# Patient Record
Sex: FEMALE | Employment: FULL TIME | URBAN - METROPOLITAN AREA
[De-identification: names, ages, dates, MRNs, and addresses within clinical notes are randomized per-mention and may not be internally consistent; named-entity substitution may affect disease eponyms.]

---

## 2017-01-25 ENCOUNTER — OFFICE VISIT (OUTPATIENT)
Dept: OBGYN | Facility: CLINIC | Age: 27
End: 2017-01-25
Payer: COMMERCIAL

## 2017-01-25 VITALS
SYSTOLIC BLOOD PRESSURE: 120 MMHG | DIASTOLIC BLOOD PRESSURE: 74 MMHG | HEART RATE: 62 BPM | WEIGHT: 158 LBS | HEIGHT: 68 IN | BODY MASS INDEX: 23.95 KG/M2

## 2017-01-25 DIAGNOSIS — Z12.4 SCREENING FOR CERVICAL CANCER: ICD-10-CM

## 2017-01-25 DIAGNOSIS — Z01.419 ENCOUNTER FOR GYNECOLOGICAL EXAMINATION WITHOUT ABNORMAL FINDING: Primary | ICD-10-CM

## 2017-01-25 DIAGNOSIS — Z97.5 IUD (INTRAUTERINE DEVICE) IN PLACE: ICD-10-CM

## 2017-01-25 PROCEDURE — G0145 SCR C/V CYTO,THINLAYER,RESCR: HCPCS | Performed by: OBSTETRICS & GYNECOLOGY

## 2017-01-25 PROCEDURE — 99395 PREV VISIT EST AGE 18-39: CPT | Performed by: OBSTETRICS & GYNECOLOGY

## 2017-01-25 ASSESSMENT — ANXIETY QUESTIONNAIRES
1. FEELING NERVOUS, ANXIOUS, OR ON EDGE: NOT AT ALL
GAD7 TOTAL SCORE: 0
2. NOT BEING ABLE TO STOP OR CONTROL WORRYING: NOT AT ALL
5. BEING SO RESTLESS THAT IT IS HARD TO SIT STILL: NOT AT ALL
6. BECOMING EASILY ANNOYED OR IRRITABLE: NOT AT ALL
3. WORRYING TOO MUCH ABOUT DIFFERENT THINGS: NOT AT ALL
7. FEELING AFRAID AS IF SOMETHING AWFUL MIGHT HAPPEN: NOT AT ALL
IF YOU CHECKED OFF ANY PROBLEMS ON THIS QUESTIONNAIRE, HOW DIFFICULT HAVE THESE PROBLEMS MADE IT FOR YOU TO DO YOUR WORK, TAKE CARE OF THINGS AT HOME, OR GET ALONG WITH OTHER PEOPLE: NOT DIFFICULT AT ALL

## 2017-01-25 ASSESSMENT — PATIENT HEALTH QUESTIONNAIRE - PHQ9: 5. POOR APPETITE OR OVEREATING: NOT AT ALL

## 2017-01-25 NOTE — PROGRESS NOTES
Ana is a 26 year old No obstetric history on file. female who presents for annual exam.     Besides routine health maintenance, she would like to discuss IUD.    HPI:  No primary care provider on file..    Patient is doing great. Somehow forgot to come in last year for her exam so was sure her IUD was expiring any day now. Checked and placed  so has another 18 months or so on it.  Patient absolutely loves it. Gets very rare spotting for maybe one day and that's it. No bloating, no issues. Not pleasant to have placed so not looking forward to removal/reinsert so happy to hear she has more time on it.  Still working as an  and has her 5th test in 2 weeks. Has a great job and well paid and likely won't take the last 3 tests b/c doesn't really get her much besides more pay and responsibility and at this point she wants to just enjoy her life.  Still with her same BF for a couple of years. Did take a little break b/c he was complacent and felt like they were roommates more than BF/GF. He has done a lot better and they are now back together and living together.  No other health concerns. Due for a pap. [place] on radio today about someone having cervical cancer and it made her wonder what screening we do for ovarian. No fhx of breast or ovarian cancer that she knows of. Her mom's aunt had what was thought to be uterine but had cancer in one ovary as well. Not sure if it was primary or a met. Great aunt  of it and no one really knows now      GYNECOLOGIC HISTORY:    No LMP recorded. Patient is not currently having periods (Reason: IUD).  Her current contraception method is: IUD.  She  reports that she has never smoked. She does not have any smokeless tobacco history on file.    Patient is sexually active.  STD testing offered?  Declined  Last PHQ-9 score on record =   PHQ-9 SCORE 2017   Total Score 1     Last GAD7 score on record =   TERESA-7 SCORE 2017   Total Score 0     Alcohol Score =  "4    HEALTH MAINTENANCE:  Cholesterol: (No results found for: CHOL   Last Mammo: NA  Pap: 5/3/13 neg  Colonoscopy: NA  Dexa:  Never    Health maintenance updated:  yes    HISTORY:  Obstetric History     No data available          Patient Active Problem List   Diagnosis     IUD (intrauterine device) in place     No past surgical history on file.   Social History   Substance Use Topics     Smoking status: Never Smoker      Smokeless tobacco: Not on file     Alcohol Use: Yes           Current Outpatient Prescriptions   Medication Sig     levonorgestrel (MIRENA) 20 MCG/24HR IUD 1 each by Intrauterine route once     No current facility-administered medications for this visit.     No Known Allergies    Past medical, surgical, social and family histories were reviewed and updated in EPIC.    ROS:   12 point review of systems negative other than symptoms noted below.    EXAM:  /74 mmHg  Pulse 62  Ht 5' 7.5\" (1.715 m)  Wt 158 lb (71.668 kg)  BMI 24.37 kg/m2   BMI: Body mass index is 24.37 kg/(m^2).    PHYSICAL EXAM:  Constitutional:  Appearance: Well nourished, well developed, alert, in no acute distress  Neck:  Lymph Nodes:  No lymphadenopathy present    Thyroid:  Gland size normal, nontender, no nodules or masses present  on palpation  Chest:  Respiratory Effort:  Breathing unlabored  Cardiovascular:    Heart: Auscultation:  Regular rate, normal rhythm, no murmurs present  Breasts: Inspection of Breasts:  No lymphadenopathy present    Palpation of Breasts and Axillae:  No masses present on palpation, no  breast tenderness    Axillary Lymph Nodes:  No lymphadenopathy present  Gastrointestinal:   Abdominal Examination:  Abdomen nontender to palpation, tone normal without rigidity or guarding, no masses present, umbilicus without lesions   Liver and Spleen:  No hepatomegaly present, liver nontender to palpation    Hernias:  No hernias present  Lymphatic: Lymph Nodes:  No other lymphadenopathy present  Skin:  General " Inspection:  No rashes present, no lesions present, no areas of  discoloration    Genitalia and Groin:  No rashes present, no lesions present, no areas of  discoloration, no masses present  Neurologic/Psychiatric:    Mental Status:  Oriented X3     Pelvic Exam:  External Genitalia:     Normal appearance for age, no discharge present, no tenderness present, no inflammatory lesions present, color normal  Vagina:    Normal vaginal vault without central or paravaginal defects, no discharge present, no inflammatory lesions present, no masses present  Bladder:     Nontender to palpation  Urethra:   Urethral Body:  Urethra palpation normal, urethra structural support normal   Urethral Meatus:  No erythema or lesions present  Cervix:     Appearance healthy, no lesions present, nontender to palpation, no bleeding present, string present  Uterus:     Nontender to palpation, no masses present, position anteflexed, mobility: normal  Adnexa:     No adnexal tenderness present, no adnexal masses present  Perineum:     Perineum within normal limits, no evidence of trauma, no rashes or skin lesions present  Anus:     Anus within normal limits, no hemorrhoids present  Inguinal Lymph Nodes:     No lymphadenopathy present  Pubic Hair:     Normal pubic hair distribution for age  Genitalia and Groin:     No rashes present, no lesions present, no areas of discoloration, no masses present    COUNSELING:   Reviewed preventive health counseling, as reflected in patient instructions    BMI: Body mass index is 24.37 kg/(m^2).      ASSESSMENT:  26 year old female with satisfactory annual exam.    ICD-10-CM    1. Encounter for gynecological examination without abnormal finding [Z01.419] Z01.419    2. Screening for cervical cancer Z12.4 Pap imaged thin layer screen reflex to HPV if ASCUS - recommended age 25 - 29 years   3. IUD (intrauterine device) in place Z97.5        PLAN:  Pap with reflex for HPV done today  IUD in place and patient loving  it. Will plan to do her annual closer to summer next year and then plan to swap out the IUD at that time.  Consider fasting labs at some point in the near future.    Evelyne Cuevas MD

## 2017-01-26 ASSESSMENT — PATIENT HEALTH QUESTIONNAIRE - PHQ9: SUM OF ALL RESPONSES TO PHQ QUESTIONS 1-9: 1

## 2017-01-26 ASSESSMENT — ANXIETY QUESTIONNAIRES: GAD7 TOTAL SCORE: 0

## 2017-01-27 LAB
COPATH REPORT: NORMAL
PAP: NORMAL

## 2017-01-31 NOTE — PROGRESS NOTES
Quick Note:    Pap is negative. Per current guidelines, she is due next for cervical cancer screening in 3 years. We still recommend, however, an annual women's health exam.   Please call pt to notify.     Steph Gar, DO    ______

## 2018-08-08 ENCOUNTER — OFFICE VISIT (OUTPATIENT)
Dept: OBGYN | Facility: CLINIC | Age: 28
End: 2018-08-08
Payer: COMMERCIAL

## 2018-08-08 VITALS
DIASTOLIC BLOOD PRESSURE: 70 MMHG | WEIGHT: 167.4 LBS | HEIGHT: 68 IN | BODY MASS INDEX: 25.37 KG/M2 | HEART RATE: 48 BPM | SYSTOLIC BLOOD PRESSURE: 110 MMHG

## 2018-08-08 DIAGNOSIS — Z01.419 ENCOUNTER FOR GYNECOLOGICAL EXAMINATION WITHOUT ABNORMAL FINDING: Primary | ICD-10-CM

## 2018-08-08 DIAGNOSIS — Z97.5 IUD (INTRAUTERINE DEVICE) IN PLACE: ICD-10-CM

## 2018-08-08 PROCEDURE — 99395 PREV VISIT EST AGE 18-39: CPT | Performed by: OBSTETRICS & GYNECOLOGY

## 2018-08-08 ASSESSMENT — ANXIETY QUESTIONNAIRES
2. NOT BEING ABLE TO STOP OR CONTROL WORRYING: NOT AT ALL
7. FEELING AFRAID AS IF SOMETHING AWFUL MIGHT HAPPEN: NOT AT ALL
3. WORRYING TOO MUCH ABOUT DIFFERENT THINGS: SEVERAL DAYS
1. FEELING NERVOUS, ANXIOUS, OR ON EDGE: SEVERAL DAYS
GAD7 TOTAL SCORE: 2
5. BEING SO RESTLESS THAT IT IS HARD TO SIT STILL: NOT AT ALL
6. BECOMING EASILY ANNOYED OR IRRITABLE: NOT AT ALL
IF YOU CHECKED OFF ANY PROBLEMS ON THIS QUESTIONNAIRE, HOW DIFFICULT HAVE THESE PROBLEMS MADE IT FOR YOU TO DO YOUR WORK, TAKE CARE OF THINGS AT HOME, OR GET ALONG WITH OTHER PEOPLE: NOT DIFFICULT AT ALL

## 2018-08-08 ASSESSMENT — PATIENT HEALTH QUESTIONNAIRE - PHQ9: 5. POOR APPETITE OR OVEREATING: NOT AT ALL

## 2018-08-08 NOTE — PROGRESS NOTES
Ana is a 27 year old  female who presents for annual exam.     Besides routine health maintenance, she has no other health concerns today .    HPI:  The patient's PCP is [facility].      Patient is doing great. Her BF just got a 2 yr contract with [employer] to go to [place] for his job so she is moving and really excited. Can keep her job and work remotely and so is going to and excited for a new adventure. Her mom is really struggling so having some fighting with her b/c she's upset to lose her but hoping she'll come around    Her IUD has been in for just over 5 yrs. Getting one day of spotting a month at most and nothing else which has been great. Has an appointment in one week to swap it out to a new one and then moving  but will keep coming here for visits at least for now.    Dad just dx'd with HTn in his late 40's. She is doing tons of cardio and playing regular volleyball and eats clean. Weight gain is very slow but steadily a few pounds a year. Doesn't watch the scale and feels toned and clothes still fit so not worrying about it      GYNECOLOGIC HISTORY:    Patient's last menstrual period was 2018.  Her current contraception method is: IUD.  She  reports that she has never smoked. She has never used smokeless tobacco.    Patient is sexually active.  STD testing offered?  Declined  Last PHQ-9 score on record =   PHQ-9 SCORE 2018   Total Score 2     Last GAD7 score on record =   TERESA-7 SCORE 2018   Total Score 2     Alcohol Score = 3    HEALTH MAINTENANCE:  Cholesterol: (No results found for: CHOL   Last Mammo: never, Result: not applicable, Next Mammo: age  41  Pap:   Lab Results   Component Value Date    PAP NIL 2017      Colonoscopy:  never, Result: not applicable, Next Colonoscopy: age 51 years.  Dexa:  never    Health maintenance updated:  yes    HISTORY:  Obstetric History       T0      L0     SAB0   TAB0   Ectopic0   Multiple0   Live  "Births0           Patient Active Problem List   Diagnosis     IUD (intrauterine device) in place     Past Surgical History:   Procedure Laterality Date     NO HISTORY OF SURGERY        Social History   Substance Use Topics     Smoking status: Never Smoker     Smokeless tobacco: Never Used     Alcohol use Yes      Problem (# of Occurrences) Relation (Name,Age of Onset)    Behavior Disorder (1) Maternal Grandmother: narcotic pill addiction    Colon Cancer (1) Maternal Grandfather (52): recurred at 54 but then lived 20+ yrs.  2018 of kidney failure    Hypertension (1) Father    Kidney Cancer (1) Maternal Grandfather    Type 2 Diabetes (1) Maternal Grandmother    Uterine Cancer (1) Other: maternal great aunt. had cancer in one ovary as well so not clear if ovarian or uterine primary            Current Outpatient Prescriptions   Medication Sig     levonorgestrel (MIRENA) 20 MCG/24HR IUD 1 each by Intrauterine route once     No current facility-administered medications for this visit.      No Known Allergies    Past medical, surgical, social and family histories were reviewed and updated in EPIC.    ROS:   12 point review of systems negative other than symptoms noted below.  Constitutional: Fatigue    EXAM:  /70  Pulse (!) 48  Ht 5' 7.5\" (1.715 m)  Wt 167 lb 6.4 oz (75.9 kg)  LMP 2018  Breastfeeding? No  BMI 25.83 kg/m2   BMI: Body mass index is 25.83 kg/(m^2).    PHYSICAL EXAM:  Constitutional:  Appearance: Well nourished, well developed, alert, in no acute distress  Neck:  Lymph Nodes:  No lymphadenopathy present    Thyroid:  Gland size normal, nontender, no nodules or masses present  on palpation  Chest:  Respiratory Effort:  Breathing unlabored  Cardiovascular:    Heart: Auscultation:  Regular rate, normal rhythm, no murmurs present  Breasts: Palpation of Breasts and Axillae:  No masses present on palpation, no breast tenderness. and No nodularity, asymmetry or nipple discharge " bilaterally.  Gastrointestinal:   Abdominal Examination:  Abdomen nontender to palpation, tone normal without rigidity or guarding, no masses present, umbilicus without lesions   Liver and Spleen:  No hepatomegaly present, liver nontender to palpation    Hernias:  No hernias present  Lymphatic: Lymph Nodes:  No other lymphadenopathy present  Skin:  General Inspection:  No rashes present, no lesions present, no areas of  discoloration    Genitalia and Groin:  No rashes present, no lesions present, no areas of  discoloration, no masses present  Neurologic/Psychiatric:    Mental Status:  Oriented X3     Pelvic Exam:  External Genitalia:     Normal appearance for age, no discharge present, no tenderness present, no inflammatory lesions present, color normal  Vagina:    Normal vaginal vault without central or paravaginal defects, no discharge present, no inflammatory lesions present, no masses present  Bladder:     Nontender to palpation  Urethra:   Urethral Body:  Urethra palpation normal, urethra structural support normal   Urethral Meatus:  No erythema or lesions present  Cervix:     Appearance healthy, no lesions present, nontender to palpation, no bleeding present, string present  Uterus:     Nontender to palpation, no masses present, position anteflexed, mobility: normal  Adnexa:     No adnexal tenderness present, no adnexal masses present  Perineum:     Perineum within normal limits, no evidence of trauma, no rashes or skin lesions present  Anus:     Anus within normal limits, no hemorrhoids present  Inguinal Lymph Nodes:     No lymphadenopathy present  Pubic Hair:     Normal pubic hair distribution for age  Genitalia and Groin:     No rashes present, no lesions present, no areas of discoloration, no masses present      COUNSELING:   Reviewed preventive health counseling, as reflected in patient instructions  Special attention given to:        Contraception    BMI: Body mass index is 25.83  kg/(m^2).      ASSESSMENT:  27 year old female with satisfactory annual exam.    ICD-10-CM    1. Encounter for gynecological examination without abnormal finding Z01.419    2. IUD (intrauterine device) in place Z97.5        PLAN:  Pap is UTD for 2 more years  Return next week for IUD removal and then will replace with kyleena just for comfort and smaller size. Will take 800mg advil one hour in advance to help with cramping. Discussed pros/cons of mirena and kyleena and patient is comfortable trying the smaller one    Evelyne Cuevas MD

## 2018-08-08 NOTE — MR AVS SNAPSHOT
"              After Visit Summary   8/8/2018    Ana Mcdonnell    MRN: [id number]           Patient Information     Date Of Birth          1990        Visit Information        Provider Department      8/8/2018 1:40 PM Evelyne Cuevas MD [facility]        Today's Diagnoses     Encounter for gynecological examination without abnormal finding    -  1    IUD (intrauterine device) in place           Follow-ups after your visit        Your next 10 appointments already scheduled     Aug 15, 2018  1:00 PM CDT   Office Visit with Evelyne Cuevas MD   [facility] ([facility]    [address] [place] [id number] [phone]           Bring a current list of meds and any records pertaining to this visit. For Physicals, please bring immunization records and any forms needing to be filled out. Please arrive 10 minutes early to complete paperwork.              Who to contact     If you have questions or need follow up information about today's clinic visit or your schedule please contact [facility] directly at [phone].  Normal or non-critical lab and imaging results will be communicated to you by MUV Interactivehart, letter or phone within 4 business days after the clinic has received the results. If you do not hear from us within 7 days, please contact the clinic through SelSaharat or phone. If you have a critical or abnormal lab result, we will notify you by phone as soon as possible.  Submit refill requests through PaxVax or call your pharmacy and they will forward the refill request to us. Please allow 3 business days for your refill to be completed.          Additional Information About Your Visit        MUV Interactivehart Information     PaxVax lets you send messages to your doctor, view your test results, renew your prescriptions, schedule appointments and more. To sign up, go to [email or web address] . Click on \"Log in\" on the " "left side of the screen, which will take you to the Welcome page. Then click on \"Sign up Now\" on the right side of the page.     You will be asked to enter the access code listed below, as well as some personal information. Please follow the directions to create your username and password.     Your access code is: [email or web address]  Expires: 2018  1:30 PM     Your access code will  in 90 days. If you need help or a new code, please call your [facility] clinic or [phone].        Care EveryWhere ID     This is your Care EveryWhere ID. This could be used by other organizations to access your [facility] medical records  [id number]        Your Vitals Were     Pulse Height Last Period Breastfeeding? BMI (Body Mass Index)       48 5' 7.5\" (1.715 m) 2018 No 25.83 kg/m2        Blood Pressure from Last 3 Encounters:   18 110/70   17 120/74    Weight from Last 3 Encounters:   18 167 lb 6.4 oz (75.9 kg)   17 158 lb (71.7 kg)              Today, you had the following     No orders found for display       Primary Care Provider Office Phone # Fax #    [facility] [phone]       [place] [address]  [address] [address]        Equal Access to Services     RANJANA CHAMBERS : Hadii aad ku hadasho Soomaali, waaxda luqadaha, qaybta kaalmada adeegyada, sylvia rivera . So [facility] [phone].    ATENCIÓN: Si habla español, tiene a messer disposición servicios gratuitos de asistencia lingüística. Llame al [phone].    We comply with applicable federal civil rights laws and [place] laws. We do not discriminate on the basis of race, color, national origin, age, disability, sex, sexual orientation, or gender identity.            Thank you!     Thank you for choosing [facility]  for your care. Our goal is always to provide you with excellent care. Hearing back from our patients " is one way we can continue to improve our services. Please take a few minutes to complete the written survey that you may receive in the mail after your visit with us. Thank you!             Your Updated Medication List - Protect others around you: Learn how to safely use, store and throw away your medicines at www.disposemymeds.org.          This list is accurate as of 8/8/18  5:30 PM.  Always use your most recent med list.                   Brand Name Dispense Instructions for use Diagnosis    levonorgestrel 20 MCG/24HR IUD    MIRENA     1 each by Intrauterine route once

## 2018-08-10 ASSESSMENT — PATIENT HEALTH QUESTIONNAIRE - PHQ9: SUM OF ALL RESPONSES TO PHQ QUESTIONS 1-9: 2

## 2018-08-10 ASSESSMENT — ANXIETY QUESTIONNAIRES: GAD7 TOTAL SCORE: 2

## 2018-08-15 ENCOUNTER — OFFICE VISIT (OUTPATIENT)
Dept: OBGYN | Facility: CLINIC | Age: 28
End: 2018-08-15
Payer: COMMERCIAL

## 2018-08-15 VITALS
BODY MASS INDEX: 25.31 KG/M2 | SYSTOLIC BLOOD PRESSURE: 120 MMHG | DIASTOLIC BLOOD PRESSURE: 76 MMHG | HEIGHT: 68 IN | WEIGHT: 167 LBS

## 2018-08-15 DIAGNOSIS — Z01.812 PRE-PROCEDURE LAB EXAM: ICD-10-CM

## 2018-08-15 DIAGNOSIS — Z30.433 ENCOUNTER FOR REMOVAL AND REINSERTION OF INTRAUTERINE CONTRACEPTIVE DEVICE: Primary | ICD-10-CM

## 2018-08-15 LAB — BETA HCG QUAL IFA URINE: NEGATIVE

## 2018-08-15 PROCEDURE — 58301 REMOVE INTRAUTERINE DEVICE: CPT | Performed by: OBSTETRICS & GYNECOLOGY

## 2018-08-15 PROCEDURE — 58300 INSERT INTRAUTERINE DEVICE: CPT | Performed by: OBSTETRICS & GYNECOLOGY

## 2018-08-15 PROCEDURE — 84703 CHORIONIC GONADOTROPIN ASSAY: CPT | Performed by: OBSTETRICS & GYNECOLOGY

## 2018-08-15 NOTE — MR AVS SNAPSHOT
"              After Visit Summary   8/15/2018    Ana Mcdonnell    MRN: [id number]           Patient Information     Date Of Birth          1990        Visit Information        Provider Department      8/15/2018 1:00 PM Evelyne Cuevas MD [facility]        Today's Diagnoses     Encounter for removal and reinsertion of intrauterine contraceptive device    -  1    Pre-procedure lab exam           Follow-ups after your visit        Who to contact     If you have questions or need follow up information about today's clinic visit or your schedule please contact [facility] directly at [phone].  Normal or non-critical lab and imaging results will be communicated to you by MyChart, letter or phone within 4 business days after the clinic has received the results. If you do not hear from us within 7 days, please contact the clinic through Gear Energyhart or phone. If you have a critical or abnormal lab result, we will notify you by phone as soon as possible.  Submit refill requests through CorNova or call your pharmacy and they will forward the refill request to us. Please allow 3 business days for your refill to be completed.          Additional Information About Your Visit        MyChart Information     CorNova lets you send messages to your doctor, view your test results, renew your prescriptions, schedule appointments and more. To sign up, go to [email or web address] . Click on \"Log in\" on the left side of the screen, which will take you to the Welcome page. Then click on \"Sign up Now\" on the right side of the page.     You will be asked to enter the access code listed below, as well as some personal information. Please follow the directions to create your username and password.     Your access code is: [email or web address]  Expires: 2018  1:30 PM     Your access code will  in 90 days. If you need help or a new code, please call your [facility] or [phone].      " "  Care EveryWhere ID     This is your Care EveryWhere ID. This could be used by other organizations to access your [facility] medical records  [id number]        Your Vitals Were     Height Last Period BMI (Body Mass Index)             5' 7.5\" (1.715 m) 07/24/2018 25.77 kg/m2          Blood Pressure from Last 3 Encounters:   08/15/18 120/76   08/08/18 110/70   01/25/17 120/74    Weight from Last 3 Encounters:   08/15/18 167 lb (75.8 kg)   08/08/18 167 lb 6.4 oz (75.9 kg)   01/25/17 158 lb (71.7 kg)              We Performed the Following     Beta HCG Qual, Urine - [facility] and [place] ([id number])     C KYLEENA IUD 19.5 MG     INSERTION INTRAUTERINE DEVICE     REMOVE INTRAUTERINE DEVICE          Today's Medication Changes          These changes are accurate as of 8/15/18  1:32 PM.  If you have any questions, ask your nurse or doctor.               Start taking these medicines.        Dose/Directions    levonorgestrel 19.5 MG IUD   Commonly known as:  KYLEENA   Used for:  Encounter for removal and reinsertion of intrauterine contraceptive device   Started by:  Evelyne Cuevas MD        Dose:  1 each   1 each (19.5 mg) by Intrauterine route once for 1 dose   Quantity:  1 each   Refills:  0            Where to get your medicines      Some of these will need a paper prescription and others can be bought over the counter.  Ask your nurse if you have questions.     You don't need a prescription for these medications     levonorgestrel 19.5 MG IUD                Primary Care Provider Office Phone # Fax #    [facility] [phone]       [facility] [address]  [facility] [address]  [facility] [address]        Equal Access to Services     RANJANA CHAMBERS AH: Devorah Jefferson, wakate loza, qafernanda kaalmada mata, sylvia benitez. So [facility] [phone].    ATENCIÓN: Si habla español, tiene a messer disposición servicios gratuitos de " asistencia lingüística. Lori al [phone].    We comply with applicable federal civil rights laws and [place] laws. We do not discriminate on the basis of race, color, national origin, age, disability, sex, sexual orientation, or gender identity.            Thank you!     Thank you for choosing [facility]  for your care. Our goal is always to provide you with excellent care. Hearing back from our patients is one way we can continue to improve our services. Please take a few minutes to complete the written survey that you may receive in the mail after your visit with us. Thank you!             Your Updated Medication List - Protect others around you: Learn how to safely use, store and throw away your medicines at www.disposemymeds.org.          This list is accurate as of 8/15/18  1:32 PM.  Always use your most recent med list.                   Brand Name Dispense Instructions for use Diagnosis    levonorgestrel 19.5 MG IUD    KYLEENA    1 each    1 each (19.5 mg) by Intrauterine route once for 1 dose    Encounter for removal and reinsertion of intrauterine contraceptive device

## 2018-08-15 NOTE — PROGRESS NOTES
SUBJECTIVE:    Is a pregnancy test required: Yes.  Was it positive or negative?  Negative  Was a consent obtained?  Yes    Subjective: Ana Mcdonnell is a 27 year old  presents for IUD and desires Kyleena type IUD.  She requests removal of the IUD because the IUD effectiveness has     Patient has been given the opportunity to ask questions about all forms of birth control, including all options appropriate for Ana Mcdonnell. Discussed that no method of birth control, except abstinence is 100% effective against pregnancy or sexually transmitted infection.     Ana Mcdonnell understands she may have the IUD removed at any time. IUD should be removed by a health care provider and the current IUD will be removed today.    The entire removal and insertion procedure was reviewed with the patient, including care after placement.    PROCEDURE:    Premedicated with ibuprofen.  A speculum exam was performed and the cervix was visualized. The IUD string was visualized. Using ring forceps, the string  was grasped and the IUD removed intact.    Under sterile technique, cervix was visualized with speculum and prepped with Betadine solution swab x 3. Tenaculum was placed for stability. The uterus was gently straightened and sounded to 7.0 cm. IUD prepared for placement, and IUD inserted according to 's instructions without difficulty or significant ressitance, and deployed at the fundus. The strings were visualized and trimmed to 3.0 cm from the external os. Tenaculum was removed and hemostasis noted. Speculum removed.  Patient tolerated procedure well.    EBL: minimal    Complications: none      POST PROCEDURE:    Given 's handouts, including when to have IUD removed, list of danger s/sx, side effects and follow up recommended. Encouraged condom use for prevention of STD. Advised to call for any fever, for prolonged or severe pain or bleeding, abnormal vaginal dischage, or unable to palpate  strings. She was advised to use pain medications (ibuprofen) as needed for mild to moderate pain.   Evelyne Cuevas MD

## 2019-11-07 ENCOUNTER — TELEPHONE (OUTPATIENT)
Dept: OBGYN | Facility: CLINIC | Age: 29
End: 2019-11-07

## 2019-11-07 NOTE — TELEPHONE ENCOUNTER
Type of Paperwork received:  Emotional support animal     Date Rcvd:  11/7/19    Rcvd From (Company name): [email or web address]    Provider:  Mason Keller on Provider Cart Date:  11/7/19

## 2019-11-09 ENCOUNTER — TELEPHONE (OUTPATIENT)
Dept: OBGYN | Facility: CLINIC | Age: 29
End: 2019-11-09

## 2019-11-09 NOTE — TELEPHONE ENCOUNTER
I received some paperwork from this patient to fill out that allows her dog to be considered an emotional support animal to allow her to fly with him.  A few years back I filled out paperwork similar for a dog but it was to allow her to have the dog in her apt buidling that didn't technically allow pets and I filled it out.  Since then criteria are much different and especially for flying with a dog, of any kind, much less a half pitbull and half bulldog, I can't fill out the papers she's asking for.  I feel bad not to be able to help her and please let her know that. The problem is that I am certifying that the dog is safe based on her word. If the dog then bit someone or something happened I am on the line.  I don't certify animals personally and have no qualifications to do so and having never even laid eyes on this particular dog I just can't do that.    Again, tell her I'm really sorry. I really do want to be helpful. But this is just not within my scope of practice to be able to do.

## 2019-11-11 NOTE — TELEPHONE ENCOUNTER
Called and left a detailed vm with below message from Dr Cuevas.  Encouraged to call and speak with nurse with any questions    Marie Rasmussen RN on 11/11/2019 at 11:16 AM

## 2020-12-22 NOTE — PROGRESS NOTES
"Ana is a 30 year old  female who presents for annual exam.     Besides routine health maintenance, she has no other health concerns today .    HPI:  The patient's PCP is none.      Patient hasn't been seen by me in about 2 yrs or so after moving to [place] with her BF at the time.  Since that time, as reported to me previously by her mom who is my patient, and today by herself, she began to abuse alcohol. Had a couple of tickets/accidents due to it and was making very unsafe choices  Parents tried to stage interventions and she didn't accept. Broke up with her BF that she moved there with. He was also drinking quite a bit though not to her level and yet never said anything to her about it being a problem or offering to help/intervene    Finally had an accident that was within the probation period of her DUI and finally realized b/c of legal ramifications that it was an issues. Went to rehab for 30 days right as everything locked down for pandemic.   Now sober 10 months and feeling really strong and great and in a good head space. Has a therapist/psych provider in [place] that she is in regular contact with. Now on fluoxetine 40mg for anxiety, ruminating, over analyzing, second guessing/self doubt. Felt good on 20mg but not quite enough and now on 40mg is feeling good. Definitely feels like her memory isn't as great and maybe just not as clear headed/focused but will take that considering her anxiety is completely under control. Has 2 days every month that she now realizes is cyclic and PMS, where just feels completely unmotivated, tired, \"blah\", wants to be left alone, etc. Then completely resolves. Otherwise has absolutely no period with her kyleena that has been in since  and loves it. Now that knows that part is hormonal and cyclic it's much easier to manage b/c can prepare and is aware.    Has a new BF since this summer. He has a 8 yo with autism that he coparents. Hasn't met the son yet and no " urgency to do so. BF doesn't want more kids and she's never wanted kids so really happy with that. Relationship is great. He's a social drinker but actually quite minimal so a good support. Has a good support system of friends who are all social drinkers but not excessive so feels like didn't have to give up her support and friend group. Loves [place] and has no desire to move back here. Has her  job and has been doing it remotely since her move and loves it. BF never finished HS. Established and has a good job but her parents/mom really don't like that for her which bothers her. Not rushing them meeting him and just going to enjoy dating him for now.  Has had STD testing since starting to be s.a with him so denies need for it today    Patient has not had flu/gardasil/tdap but declines them all  No fasting labs recently but not fasting this afternoon and would prefer to hold off at this time      GYNECOLOGIC HISTORY:    No LMP recorded (lmp unknown). (Menstrual status: IUD).    Regular menses? no  Menses every irregular days due to Kyleena.  Length of menses: NA days    Her current contraception method is: IUD.  She  reports that she has never smoked. She has never used smokeless tobacco.    Patient is sexually active.  STD testing offered?  Declined  Last PHQ-9 score on record =   PHQ-9 SCORE 2020   PHQ-9 Total Score 0     Last GAD7 score on record =   TERESA-7 SCORE 2020   Total Score 0     Alcohol Score = 0    HEALTH MAINTENANCE:  Cholesterol: (No results found for: CHOL)  Last Mammo: Not applicable, Next Mammo: Due at age 41   Pap:   Lab Results   Component Value Date    PAP NIL 2017      Colonoscopy:  NA, Next Colonoscopy: age 46.  Dexa:  NA    Health maintenance updated:  no, due for pap smear    HISTORY:  OB History    Para Term  AB Living   0 0 0 0 0 0   SAB TAB Ectopic Multiple Live Births   0 0 0 0 0       Patient Active Problem List   Diagnosis     Presence of IUD      "Alcohol dependence in remission (H)     Anxiety     Mild episode of depression (H)     Past Surgical History:   Procedure Laterality Date     NO HISTORY OF SURGERY        Social History     Tobacco Use     Smoking status: Never Smoker     Smokeless tobacco: Never Used   Substance Use Topics     Alcohol use: Not Currently      Problem (# of Occurrences) Relation (Name,Age of Onset)    Behavior Disorder (1) Maternal Grandmother: narcotic pill addiction    Colon Cancer (1) Maternal Grandfather (52): recurred at 54 but then lived 20+ yrs.  2018 of kidney failure    Diabetes Type 2  (1) Maternal Grandmother    Hypertension (1) Father    Kidney Cancer (1) Maternal Grandfather    Uterine Cancer (1) Other: maternal great aunt. had cancer in one ovary as well so not clear if ovarian or uterine primary            Current Outpatient Medications   Medication Sig     buPROPion (WELLBUTRIN XL) 150 MG 24 hr tablet Take 1 tablet (150 mg) by mouth every morning     FLUoxetine (PROZAC) 40 MG capsule Take 40 mg by mouth daily     levonorgestrel (KYLEENA) 19.5 MG IUD 1 each (19.5 mg) by Intrauterine route once for 1 dose     No current facility-administered medications for this visit.      No Known Allergies    Past medical, surgical, social and family histories were reviewed and updated in EPIC.    ROS:   12 point review of systems negative other than symptoms noted below or in the HPI.  No urinary frequency or dysuria, bladder or kidney problems    EXAM:  /70 (BP Location: Right arm, Patient Position: Sitting, Cuff Size: Adult Regular)   Pulse (!) 42   Ht 1.715 m (5' 7.5\")   Wt 78.5 kg (173 lb)   LMP  (LMP Unknown)   BMI 26.70 kg/m     BMI: Body mass index is 26.7 kg/m .    PHYSICAL EXAM:  Constitutional:   Appearance: Well nourished, well developed, alert, in no acute distress  Neck:  Lymph Nodes:  No lymphadenopathy present    Thyroid:  Gland size normal, nontender, no nodules or masses present  on " palpation  Chest:  Respiratory Effort:  Breathing unlabored  Cardiovascular:    Heart: Auscultation:  Regular rate, normal rhythm, no murmurs present  Breasts: Palpation of Breasts and Axillae:  No masses present on palpation, no breast tenderness., Axillary Lymph Nodes:  No lymphadenopathy present. and No nodularity, asymmetry or nipple discharge bilaterally.  Gastrointestinal:   Abdominal Examination:  Abdomen nontender to palpation, tone normal without rigidity or guarding, no masses present, umbilicus without lesions   Liver and Spleen:  No hepatomegaly present, liver nontender to palpation    Hernias:  No hernias present  Lymphatic: Lymph Nodes:  No other lymphadenopathy present  Skin:  General Inspection:  No rashes present, no lesions present, no areas of  discoloration  Neurologic:    Mental Status:  Oriented X3.  Normal strength and tone, sensory exam                grossly normal, mentation intact and speech normal.    Psychiatric:   Mentation appears normal and affect normal/bright.         Pelvic Exam:  External Genitalia:     Normal appearance for age, no discharge present, no tenderness present, no inflammatory lesions present, color normal  Vagina:    Normal vaginal vault without central or paravaginal defects, no discharge present, no inflammatory lesions present, no masses present  Bladder:     Nontender to palpation  Urethra:   Urethral Body:  Urethra palpation normal, urethra structural support normal   Urethral Meatus:  No erythema or lesions present  Cervix:     Appearance healthy, no lesions present, nontender to palpation, no bleeding present, string present  Uterus:     Nontender to palpation, no masses present, position anteflexed, mobility: normal  Adnexa:     No adnexal tenderness present, no adnexal masses present  Perineum:     Perineum within normal limits, no evidence of trauma, no rashes or skin lesions present  Anus:     Anus within normal limits, no hemorrhoids present  Inguinal  Lymph Nodes:     No lymphadenopathy present  Pubic Hair:     Normal pubic hair distribution for age  Genitalia and Groin:     No rashes present, no lesions present, no areas of discoloration, no masses present      COUNSELING:   Reviewed preventive health counseling, as reflected in patient instructions  Special attention given to:        Depression         Immunizations    Declined: Human Papillomavirus, Influenza and TDAP due to Conscientious objector               Contraception       Safe sex practices/STD prevention    BMI: Body mass index is 26.7 kg/m .      ASSESSMENT:  30 year old female with satisfactory annual exam.    ICD-10-CM    1. Encounter for gynecological examination without abnormal finding  Z01.419 Pap imaged thin layer screen reflex to HPV if ASCUS - recommend age 25 - 29   2. Mild episode of depression (H)  F32.0 buPROPion (WELLBUTRIN XL) 150 MG 24 hr tablet   3. Alcohol dependence in remission (H)  F10.21    4. Anxiety  F41.9    5. Presence of IUD  Z97.5        PLAN:  Pap with reflex to HPV testing today  Reviewed her IUD status and PMS and patient is very happy with her amenorrhea, etc. Has about 3 more years on it and then can swap it out for another or consider mirena, as tolerated kyleena well, and then could get 6 yrs out of it rather than 5    Reviewed all of what she's gone through in the last year since she was here last  Commended for her amazing efforts in recovery in the midst of a pandemic and breakup and being out of state from her parents  Has great support in her friends and new BF and has a regular contact with therapist/prescriber for anxiety etc    In further discussions of her moods in terms of anxiety, fluoxetine and some PMS component it became obvious to both of us that she does have some generally blunted affect and some anhedonia with the fluox that she is not as aware of b/c so relieved to have the anxiety under control  The lack of focus and poor memory are likely from  some of this blunting  Since I am not the prescriber of the fluox encouraged her to discuss that with her provider in [place]  A chance in selective serotonin reuptake inhibitor could work better for her in terms of anxiety and depression/flattening both  Discussed selective serotonin reuptake inhibitor vs SNRI in this capacity  However also discussed addition of wellbutrin and staying on the fluox since such good control of her anxiety  Discussed the expected effects and side effects of insomnia and vivid dreams  Patient is very interested in adding wellbutrin to see If that works for her  Can do a telehealth f/u if that is an option with her residing in [place] or could do an e-visit at least in f/u in 6 weeks and then could see if her provider in [place] can take over the Rx if she is doing well on it    Spent an additional 15  min with the patient 100% of which was in face to face counseling time          Evelyne Cuevas MD

## 2020-12-23 ENCOUNTER — OFFICE VISIT (OUTPATIENT)
Dept: OBGYN | Facility: CLINIC | Age: 30
End: 2020-12-23
Payer: COMMERCIAL

## 2020-12-23 VITALS
SYSTOLIC BLOOD PRESSURE: 110 MMHG | WEIGHT: 173 LBS | BODY MASS INDEX: 26.22 KG/M2 | HEIGHT: 68 IN | DIASTOLIC BLOOD PRESSURE: 70 MMHG | HEART RATE: 42 BPM

## 2020-12-23 DIAGNOSIS — Z97.5 PRESENCE OF IUD: ICD-10-CM

## 2020-12-23 DIAGNOSIS — F32.A MILD EPISODE OF DEPRESSION: ICD-10-CM

## 2020-12-23 DIAGNOSIS — Z01.419 ENCOUNTER FOR GYNECOLOGICAL EXAMINATION WITHOUT ABNORMAL FINDING: Primary | ICD-10-CM

## 2020-12-23 DIAGNOSIS — F41.9 ANXIETY: ICD-10-CM

## 2020-12-23 DIAGNOSIS — F10.21 ALCOHOL DEPENDENCE IN REMISSION (H): ICD-10-CM

## 2020-12-23 PROCEDURE — 99395 PREV VISIT EST AGE 18-39: CPT | Performed by: OBSTETRICS & GYNECOLOGY

## 2020-12-23 PROCEDURE — G0145 SCR C/V CYTO,THINLAYER,RESCR: HCPCS | Performed by: OBSTETRICS & GYNECOLOGY

## 2020-12-23 PROCEDURE — 99213 OFFICE O/P EST LOW 20 MIN: CPT | Mod: 25 | Performed by: OBSTETRICS & GYNECOLOGY

## 2020-12-23 RX ORDER — BUPROPION HYDROCHLORIDE 150 MG/1
150 TABLET ORAL EVERY MORNING
Qty: 90 TABLET | Refills: 3 | Status: SHIPPED | OUTPATIENT
Start: 2020-12-23

## 2020-12-23 RX ORDER — FLUOXETINE 40 MG/1
40 CAPSULE ORAL DAILY
COMMUNITY

## 2020-12-23 SDOH — ECONOMIC STABILITY: FOOD INSECURITY: WITHIN THE PAST 12 MONTHS, YOU WORRIED THAT YOUR FOOD WOULD RUN OUT BEFORE YOU GOT MONEY TO BUY MORE.: NOT ASKED

## 2020-12-23 SDOH — ECONOMIC STABILITY: FOOD INSECURITY: WITHIN THE PAST 12 MONTHS, THE FOOD YOU BOUGHT JUST DIDN'T LAST AND YOU DIDN'T HAVE MONEY TO GET MORE.: NOT ASKED

## 2020-12-23 SDOH — HEALTH STABILITY: MENTAL HEALTH: HOW OFTEN DO YOU HAVE 6 OR MORE DRINKS ON ONE OCCASION?: NOT ASKED

## 2020-12-23 SDOH — HEALTH STABILITY: MENTAL HEALTH: HOW OFTEN DO YOU HAVE A DRINK CONTAINING ALCOHOL?: NOT ASKED

## 2020-12-23 SDOH — ECONOMIC STABILITY: TRANSPORTATION INSECURITY
IN THE PAST 12 MONTHS, HAS THE LACK OF TRANSPORTATION KEPT YOU FROM MEDICAL APPOINTMENTS OR FROM GETTING MEDICATIONS?: NOT ASKED

## 2020-12-23 SDOH — ECONOMIC STABILITY: TRANSPORTATION INSECURITY
IN THE PAST 12 MONTHS, HAS LACK OF TRANSPORTATION KEPT YOU FROM MEETINGS, WORK, OR FROM GETTING THINGS NEEDED FOR DAILY LIVING?: NOT ASKED

## 2020-12-23 SDOH — HEALTH STABILITY: MENTAL HEALTH: HOW MANY STANDARD DRINKS CONTAINING ALCOHOL DO YOU HAVE ON A TYPICAL DAY?: NOT ASKED

## 2020-12-23 SDOH — ECONOMIC STABILITY: INCOME INSECURITY: HOW HARD IS IT FOR YOU TO PAY FOR THE VERY BASICS LIKE FOOD, HOUSING, MEDICAL CARE, AND HEATING?: NOT ASKED

## 2020-12-23 ASSESSMENT — PATIENT HEALTH QUESTIONNAIRE - PHQ9
SUM OF ALL RESPONSES TO PHQ QUESTIONS 1-9: 0
5. POOR APPETITE OR OVEREATING: NOT AT ALL

## 2020-12-23 ASSESSMENT — ANXIETY QUESTIONNAIRES
6. BECOMING EASILY ANNOYED OR IRRITABLE: NOT AT ALL
1. FEELING NERVOUS, ANXIOUS, OR ON EDGE: NOT AT ALL
7. FEELING AFRAID AS IF SOMETHING AWFUL MIGHT HAPPEN: NOT AT ALL
3. WORRYING TOO MUCH ABOUT DIFFERENT THINGS: NOT AT ALL
2. NOT BEING ABLE TO STOP OR CONTROL WORRYING: NOT AT ALL
5. BEING SO RESTLESS THAT IT IS HARD TO SIT STILL: NOT AT ALL
GAD7 TOTAL SCORE: 0

## 2020-12-23 ASSESSMENT — MIFFLIN-ST. JEOR: SCORE: 1550.28

## 2020-12-24 PROBLEM — F41.9 ANXIETY: Status: ACTIVE | Noted: 2020-12-24

## 2020-12-24 PROBLEM — F10.21 ALCOHOL DEPENDENCE IN REMISSION (H): Status: ACTIVE | Noted: 2020-12-24

## 2020-12-24 PROBLEM — F32.A MILD EPISODE OF DEPRESSION: Status: ACTIVE | Noted: 2020-12-24

## 2020-12-24 ASSESSMENT — ANXIETY QUESTIONNAIRES: GAD7 TOTAL SCORE: 0

## 2020-12-27 ENCOUNTER — HEALTH MAINTENANCE LETTER (OUTPATIENT)
Age: 30
End: 2020-12-27

## 2020-12-29 LAB
COPATH REPORT: NORMAL
PAP: NORMAL

## 2021-10-09 ENCOUNTER — HEALTH MAINTENANCE LETTER (OUTPATIENT)
Age: 31
End: 2021-10-09

## 2022-01-29 ENCOUNTER — HEALTH MAINTENANCE LETTER (OUTPATIENT)
Age: 32
End: 2022-01-29

## 2022-09-11 ENCOUNTER — HEALTH MAINTENANCE LETTER (OUTPATIENT)
Age: 32
End: 2022-09-11

## 2023-05-06 ENCOUNTER — HEALTH MAINTENANCE LETTER (OUTPATIENT)
Age: 33
End: 2023-05-06

## 2023-10-19 ENCOUNTER — TELEPHONE (OUTPATIENT)
Dept: OBGYN | Facility: CLINIC | Age: 33
End: 2023-10-19
Payer: COMMERCIAL

## 2023-10-19 NOTE — TELEPHONE ENCOUNTER
[facility] Call Center    Phone Message    May a detailed message be left on voicemail: yes     Reason for Call: Other: pt is wanting to discuss possible pain med before IUD removal and reinsertion     Action Taken: Other: [facility]    Travel Screening: Not Applicable

## 2023-10-19 NOTE — TELEPHONE ENCOUNTER
Pt asking for a medication prior to her IUD placement in December. Has never had children, in the past has had some pain issues with IUD placement. Her mother will be with her for her appt so will have a . Knows to take ibuprofen one hour before her appt.    Ok to send a [facility] message with Dr. Cuevas response    Alanna Kendrick RN on 10/19/2023 at 10:24 AM

## 2023-10-24 ENCOUNTER — MYC MEDICAL ADVICE (OUTPATIENT)
Dept: OBGYN | Facility: CLINIC | Age: 33
End: 2023-10-24
Payer: COMMERCIAL

## 2023-10-24 NOTE — TELEPHONE ENCOUNTER
After discussion regarding xanax and narcotics Ana has decided just to take the ibuprofen before her IUD placement. Hx of addiction discussed.     Pt is ok with plan moving forward.   Alanna Kendrick RN on 10/24/2023 at 4:43 PM

## 2023-10-24 NOTE — TELEPHONE ENCOUNTER
Did we do meds before, when she had the last one placed?  I can do either xanax and ibuprofen or xanax and oxy if needed but she has h.o alcoholism and is sober and don't want to do controlled substances with addiction potential, more than needed. So can we see what if anything we did for her last time

## 2023-12-21 NOTE — PROGRESS NOTES
SUBJECTIVE:    Subjective: Ana Mcdonnell is a 33 year old  presents for IUD and desires Kyleena type IUD.    She requests removal of the IUD because the IUD effectiveness has     Patient has been given the opportunity to ask questions about all forms of birth control, including all options appropriate for Ana Mcdonnell. Discussed that no method of birth control, except abstinence is 100% effective against pregnancy or sexually transmitted infection.     Ana Mcdonnell understands she may have the IUD removed at any time. IUD should be removed by a health care provider and the current IUD will be removed today.    The entire removal and insertion procedure was reviewed with the patient, including care after placement.    Patient had a mirena IUD from  to  and did great with it and amenorrhea. When time to swap it summer of 2018, b/c both were 5 yrs anyway and she was a young nullip, decided to do kyleen for smaller size for insertion itself but also for in the uterus. Had spotting for 2-3 days just a couple of months ago for the very first time in this entire 5 yrs and very light and no cramping.    Does not ever want kids. Got engaged a week ago and very excited. Miguel Ángel has a 10 yo son who has ASD. Lives with Miguel Ángel 50% of the time but her and Miguel Ángel don't live together and when he has him they're at his house. Plan is to buy a house together next summer and then have [place] smaller intimate [place].  Definitely does not want kids and never has. Miguel Ángel doesn't want more kids either. Really happy with IUD    Patient hasn't had a pap in [place] and is due as her last was just over 3 yrs ago but no HPV since <31 yrs old and is definitely open to having that    Has a prescriber for her psych meds in [place] and doing great on that. Has lost nearly 20# since getting sober and then resetting for a year and now has gotten back to exercise and trying to be healthy and take care  of herself, etc.    Today's PHQ-2 Score:       12/22/2023     1:28 PM   PHQ-2 ( 1999 [employer])   Q1: Little interest or pleasure in doing things 0   Q2: Feeling down, depressed or hopeless 0   PHQ-2 Score 0       PROCEDURE:    Discussed the logic of why we had [facility], though hadn't had any issues with the mirena. When both were a 5 yr swap, then it made sense with nulliparity to use a smaller one for insertion ease and to decrease general cramping/discomfort with the IUD in the uterus   Now that mirena provides 8 yrs of contraception and tolerated it well originally we discussed the pros/cons of [facility] and she definitely wanted to do that.    Premedicated with ibuprofen.  A speculum exam was performed and the cervix was visualized. The IUD string was visualized. Using ring forceps, the string was grasped and the IUD removed intact.    Under sterile technique, cervix was visualized with speculum and prepped with Betadine solution swab x 3. Tenaculum was placed for stability. The uterus was gently straightened and sounded to 7.5 cm. IUD prepared for placement, and IUD inserted according to 's instructions without difficulty or significant ressitance, and deployed at the fundus. The strings were visualized and trimmed to 3.0 cm from the external os. Tenaculum was removed and hemostasis noted. Speculum removed.  Patient tolerated procedure well.    Lot # [id number]  NDC: 11032-795-98  Exp: 12/01/2025    EBL: minimal    Complications: none      POST PROCEDURE:    Given 's handouts, including when to have IUD removed, list of danger s/sx, side effects and follow up recommended. Encouraged condom use for prevention of STD. Advised to call for any fever, for prolonged or severe pain or bleeding, abnormal vaginal dischage, or unable to palpate strings. She was advised to use pain medications (ibuprofen) as needed for mild to moderate pain. Advised to  follow-up in clinic in 4-6 weeks for IUD string check if unable to find strings or as directed by provider.     Pap/HPV were done today and will see her results on mychart  Can follow routine ASCCP guidelines    Not fasting for any labs and based on her age and weight loss this is likely not needing for another 2-3 yrs.    Defer psych mgmt to her doc in [place] but she is doing well    20 minutes in addition to the IUD insertion were spent on direct management of the patient's other medical issues as above as well as chart review including: imaging, lab work, previous visit notes by this provider, and other provider notes, as well as chart completion on the same DOS    Evelyne Cuevas MD

## 2023-12-22 ENCOUNTER — OFFICE VISIT (OUTPATIENT)
Dept: OBGYN | Facility: CLINIC | Age: 33
End: 2023-12-22
Payer: COMMERCIAL

## 2023-12-22 VITALS
SYSTOLIC BLOOD PRESSURE: 104 MMHG | BODY MASS INDEX: 23.49 KG/M2 | HEIGHT: 68 IN | DIASTOLIC BLOOD PRESSURE: 60 MMHG | WEIGHT: 155 LBS

## 2023-12-22 DIAGNOSIS — Z12.4 CERVICAL CANCER SCREENING: ICD-10-CM

## 2023-12-22 DIAGNOSIS — Z30.433 ENCOUNTER FOR REMOVAL AND REINSERTION OF INTRAUTERINE CONTRACEPTIVE DEVICE: Primary | ICD-10-CM

## 2023-12-22 PROCEDURE — 99212 OFFICE O/P EST SF 10 MIN: CPT | Mod: 25 | Performed by: OBSTETRICS & GYNECOLOGY

## 2023-12-22 PROCEDURE — 87624 HPV HI-RISK TYP POOLED RSLT: CPT | Performed by: OBSTETRICS & GYNECOLOGY

## 2023-12-22 PROCEDURE — 58301 REMOVE INTRAUTERINE DEVICE: CPT | Performed by: OBSTETRICS & GYNECOLOGY

## 2023-12-22 PROCEDURE — G0145 SCR C/V CYTO,THINLAYER,RESCR: HCPCS | Performed by: OBSTETRICS & GYNECOLOGY

## 2023-12-22 PROCEDURE — 58300 INSERT INTRAUTERINE DEVICE: CPT | Performed by: OBSTETRICS & GYNECOLOGY

## 2023-12-28 LAB
BKR LAB AP GYN ADEQUACY: NORMAL
BKR LAB AP GYN INTERPRETATION: NORMAL
BKR LAB AP HPV REFLEX: NORMAL
BKR LAB AP PREVIOUS ABNORMAL: NORMAL
PATH REPORT.COMMENTS IMP SPEC: NORMAL
PATH REPORT.COMMENTS IMP SPEC: NORMAL
PATH REPORT.RELEVANT HX SPEC: NORMAL

## 2023-12-29 LAB
HUMAN PAPILLOMA VIRUS 16 DNA: NEGATIVE
HUMAN PAPILLOMA VIRUS 18 DNA: NEGATIVE
HUMAN PAPILLOMA VIRUS FINAL DIAGNOSIS: NORMAL
HUMAN PAPILLOMA VIRUS OTHER HR: NEGATIVE

## 2024-07-13 ENCOUNTER — HEALTH MAINTENANCE LETTER (OUTPATIENT)
Age: 34
End: 2024-07-13

## 2025-07-19 ENCOUNTER — HEALTH MAINTENANCE LETTER (OUTPATIENT)
Age: 35
End: 2025-07-19